# Patient Record
Sex: MALE | Race: WHITE | HISPANIC OR LATINO | Employment: FULL TIME | URBAN - METROPOLITAN AREA
[De-identification: names, ages, dates, MRNs, and addresses within clinical notes are randomized per-mention and may not be internally consistent; named-entity substitution may affect disease eponyms.]

---

## 2024-08-10 ENCOUNTER — OFFICE VISIT (OUTPATIENT)
Dept: URGENT CARE | Facility: URGENT CARE | Age: 24
End: 2024-08-10
Payer: OTHER MISCELLANEOUS

## 2024-08-10 ENCOUNTER — APPOINTMENT (OUTPATIENT)
Dept: RADIOLOGY | Facility: HOSPITAL | Age: 24
End: 2024-08-10
Attending: STUDENT IN AN ORGANIZED HEALTH CARE EDUCATION/TRAINING PROGRAM
Payer: OTHER MISCELLANEOUS

## 2024-08-10 ENCOUNTER — HOSPITAL ENCOUNTER (EMERGENCY)
Facility: HOSPITAL | Age: 24
Discharge: HOME | End: 2024-08-10
Attending: EMERGENCY MEDICINE | Admitting: EMERGENCY MEDICINE
Payer: OTHER MISCELLANEOUS

## 2024-08-10 VITALS
SYSTOLIC BLOOD PRESSURE: 146 MMHG | RESPIRATION RATE: 20 BRPM | WEIGHT: 171 LBS | DIASTOLIC BLOOD PRESSURE: 80 MMHG | HEART RATE: 59 BPM | OXYGEN SATURATION: 99 % | TEMPERATURE: 97 F

## 2024-08-10 VITALS
HEART RATE: 79 BPM | RESPIRATION RATE: 19 BRPM | OXYGEN SATURATION: 97 % | DIASTOLIC BLOOD PRESSURE: 86 MMHG | WEIGHT: 213.63 LBS | SYSTOLIC BLOOD PRESSURE: 136 MMHG | TEMPERATURE: 98.3 F

## 2024-08-10 DIAGNOSIS — S69.91XA FINGERNAIL INJURY, RIGHT, INITIAL ENCOUNTER: ICD-10-CM

## 2024-08-10 DIAGNOSIS — Z23 NEED FOR TETANUS BOOSTER: ICD-10-CM

## 2024-08-10 DIAGNOSIS — S67.02XA CRUSH INJURY TO THUMB, LEFT, INITIAL ENCOUNTER: Primary | ICD-10-CM

## 2024-08-10 PROCEDURE — 12002 RPR S/N/AX/GEN/TRNK2.6-7.5CM: CPT

## 2024-08-10 PROCEDURE — 250N000009 HC RX 250: Performed by: EMERGENCY MEDICINE

## 2024-08-10 PROCEDURE — 90715 TDAP VACCINE 7 YRS/> IM: CPT | Performed by: EMERGENCY MEDICINE

## 2024-08-10 PROCEDURE — 90471 IMMUNIZATION ADMIN: CPT | Performed by: EMERGENCY MEDICINE

## 2024-08-10 PROCEDURE — 99204 OFFICE O/P NEW MOD 45 MIN: CPT | Performed by: PHYSICIAN ASSISTANT

## 2024-08-10 PROCEDURE — 250N000013 HC RX MED GY IP 250 OP 250 PS 637: Performed by: EMERGENCY MEDICINE

## 2024-08-10 PROCEDURE — 99283 EMERGENCY DEPT VISIT LOW MDM: CPT | Mod: 25

## 2024-08-10 PROCEDURE — 73140 X-RAY EXAM OF FINGER(S): CPT | Mod: RT

## 2024-08-10 PROCEDURE — 250N000011 HC RX IP 250 OP 636: Performed by: EMERGENCY MEDICINE

## 2024-08-10 RX ORDER — CLINDAMYCIN HCL 150 MG
450 CAPSULE ORAL ONCE
Status: COMPLETED | OUTPATIENT
Start: 2024-08-10 | End: 2024-08-10

## 2024-08-10 RX ORDER — CLINDAMYCIN HCL 150 MG
450 CAPSULE ORAL 3 TIMES DAILY
Qty: 63 CAPSULE | Refills: 0 | Status: SHIPPED | OUTPATIENT
Start: 2024-08-10 | End: 2024-08-17

## 2024-08-10 RX ORDER — GINSENG 100 MG
CAPSULE ORAL ONCE
Status: COMPLETED | OUTPATIENT
Start: 2024-08-10 | End: 2024-08-10

## 2024-08-10 RX ADMIN — CLOSTRIDIUM TETANI TOXOID ANTIGEN (FORMALDEHYDE INACTIVATED), CORYNEBACTERIUM DIPHTHERIAE TOXOID ANTIGEN (FORMALDEHYDE INACTIVATED), BORDETELLA PERTUSSIS TOXOID ANTIGEN (GLUTARALDEHYDE INACTIVATED), BORDETELLA PERTUSSIS FILAMENTOUS HEMAGGLUTININ ANTIGEN (FORMALDEHYDE INACTIVATED), BORDETELLA PERTUSSIS PERTACTIN ANTIGEN, AND BORDETELLA PERTUSSIS FIMBRIAE 2/3 ANTIGEN 0.5 ML: 5; 2; 2.5; 5; 3; 5 INJECTION, SUSPENSION INTRAMUSCULAR at 19:54

## 2024-08-10 RX ADMIN — BACITRACIN: 500 OINTMENT TOPICAL at 20:17

## 2024-08-10 RX ADMIN — CLINDAMYCIN HYDROCHLORIDE 450 MG: 150 CAPSULE ORAL at 20:27

## 2024-08-10 ASSESSMENT — COLUMBIA-SUICIDE SEVERITY RATING SCALE - C-SSRS
1. IN THE PAST MONTH, HAVE YOU WISHED YOU WERE DEAD OR WISHED YOU COULD GO TO SLEEP AND NOT WAKE UP?: NO
6. HAVE YOU EVER DONE ANYTHING, STARTED TO DO ANYTHING, OR PREPARED TO DO ANYTHING TO END YOUR LIFE?: NO
2. HAVE YOU ACTUALLY HAD ANY THOUGHTS OF KILLING YOURSELF IN THE PAST MONTH?: NO

## 2024-08-10 ASSESSMENT — ACTIVITIES OF DAILY LIVING (ADL)
ADLS_ACUITY_SCORE: 35

## 2024-08-10 ASSESSMENT — PAIN SCALES - GENERAL: PAINLEVEL: SEVERE PAIN (6)

## 2024-08-10 NOTE — PROGRESS NOTES
Tim ryanne  Crush between tow metal pipes  215pm today        BP (!) 146/80 (BP Location: Left arm, Patient Position: Sitting, Cuff Size: Adult Regular)   Pulse 59   Temp 97  F (36.1  C) (Temporal)   Resp 20   Wt 77.6 kg (171 lb)   SpO2 99%     Significant trauma to nail bed  Numbness at thumb pad    (S67.02XA) Crush injury to thumb, left, initial encounter  (primary encounter diagnosis)  Comment: numbness, nail bed trauma  Plan: to ED for assessment and treatment       <<--- Click to launch

## 2024-08-10 NOTE — Clinical Note
Ilia Jaffe Reji was seen and treated in our emergency department on 8/10/2024.  He may return to work on 08/13/2024.       If you have any questions or concerns, please don't hesitate to call.      Toshia Gifford RN

## 2024-08-10 NOTE — ED TRIAGE NOTES
Happened at 1415 while trying to move steel forms that are heavy another co -worker jammed it on pt's right finger.  Dressing in place.  Last Tetanus on the list was 2010

## 2024-08-11 NOTE — DISCHARGE INSTRUCTIONS
Running water over the laceration is ok but do not soak the wound until the sutures are removed; soaking could prematurely loosen the sutures.    Use over-the-counter ointment (like Bacitracin or Neosporin) to the laceration to help prevent infection. Vaseline is also just as good at preventing infection and often much cheaper.    Get the sutures removed in 7 days in primary care clinic.    Take the antibiotic (clindamycin) to help prevent infection.    Return for any pus draining, streaking skin redness, or any other concerns.

## 2024-08-11 NOTE — ED PROVIDER NOTES
EMERGENCY DEPARTMENT ENCOUNTER      NAME: Ilia Mendoza  AGE: 24 year old male  YOB: 2000  MRN: [id number]  EVALUATION DATE & TIME: 8/10/2024  5:58 PM    PCP: No Ref-Primary, Physician    ED PROVIDER: Dale Encarnacion M.D.      Chief Complaint   Patient presents with    Finger         IMPRESSION  1. Fingernail injury, right, initial encounter    2. 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 No laceration to skin but nail dislocated from its proximal base. X-rays with no fracture. No clinical concern for neurovascular injury, tendon injury.    Digital block placed and nail re-seated in eponychial fold. Nail intact with no subungual hematoma or laceration. Nail sutured in placed with 4 4-0 Ethilon sutures. Patient tolerated this well. Dressed with bacitracin & clean bandage. Given prophylactic clindamycin as well given overall dirty construction-worker hands. Tetanus updated too. Ok for outpatient follow up. Patient comfortable with discharge at this time. Return precautions and need for PCP follow up discussed and understood. No further questions at the time of discharge.       --------------------------------------------------------------------------------   --------------------------------------------------------------------------------     7:37 PM I met with the patient for the initial interview and physical examination. Discussed plan for treatment and workup in the ED.      This patient involved a high degree of complexity in medical decision making, as significant risks were present and assessed. Recent  encounters & results in medical record reviewed by me.    All workup (i.e. any EKG/labs/imaging as per charting below) reviewed and independently interpreted by me. See respective sections for details.    Broad differential considered for this patient, including but not limited to:  superficial laceration, nail injury, nailbed laceration, subungual hematoma, fracture, tendon injury, neurovascular injury      See additional MDM below if interested.      MEDICATIONS GIVEN IN THE EMERGENCY DEPARTMENT  Medications   Tdap (tetanus-diphtheria-acell pertussis) (ADACEL) injection 0.5 mL (0.5 mLs Intramuscular $Given 8/10/24 1954)   bacitracin ointment ( Topical $Given 8/10/24 2017)   clindamycin (CLEOCIN) capsule 450 mg (450 mg Oral $Given 8/10/24 2027)       NEW PRESCRIPTIONS STARTED AT TODAY'S ER VISIT  Discharge Medication List as of 8/10/2024  8:20 PM        START taking these medications    Details   clindamycin (CLEOCIN) 150 MG capsule Take 3 capsules (450 mg) by mouth 3 times daily for 7 days, Disp-63 capsule, R-0, E-Prescribe                 =================================================================      HPI  Use of : N/A         Ilia Mendoza is a 24 year old male with no pertinent history who presents to this ED by walk-in with his spouse for evaluation of a finger injury.     Per [facility], patient's last tetanus was on 8/19/2010.     At 2:15 PM, while the patient was trying to move steel forms, another coworker jammed it on the patient's right digitus primus manus.         --------------- MEDICAL HISTORY ---------------  PAST MEDICAL HISTORY:  Reviewed independently by me.  No past medical history on file.  Patient Active Problem List   Diagnosis    Acne       PAST SURGICAL HISTORY:  Reviewed independently by me.  No past surgical history on file.    CURRENT MEDICATIONS:    Reviewed independently by me.  No current facility-administered medications for this encounter.    Current Outpatient  Medications:     clindamycin (CLEOCIN) 150 MG capsule, Take 3 capsules (450 mg) by mouth 3 times daily for 7 days, Disp: 63 capsule, Rfl: 0    ALLERGIES:  Reviewed independently by me.  No Known Allergies    FAMILY HISTORY:  Reviewed independently by me.  No family history on file.      SOCIAL HISTORY:   Reviewed independently by me.  Social History     Socioeconomic History    Marital status: Single   Tobacco Use    Smoking status: Never    Smokeless tobacco: Never       --------------- PHYSICAL EXAM ---------------  Nursing notes and vitals independently reviewed by me.  VITALS:  Vitals:    08/10/24 1754   BP: 136/86   Pulse: 79   Resp: 19   Temp: 98.3  F (36.8  C)   TempSrc: Oral   SpO2: 97%   Weight: 96.9 kg (213 lb 10 oz)       PHYSICAL EXAM:    General:  alert, interactive, no distress  Eyes:  conjunctivae clear, conjugate gaze  HENT:  atraumatic, nose with no rhinorrhea, oropharynx clear  Neck:  no meningismus  Cardiovascular:  HR 70s during exam, regular rhythm, no murmurs, brisk cap refill  Chest:  no chest wall tenderness  Pulmonary:  no stridor, normal phonation, normal work of breathing, clear lungs bilaterally  Abdomen:  soft, nondistended, nontender  :  no CVA tenderness  Back:  no midline spinal tenderness  Musculoskeletal:  Right thumb with nail intact. Entirety of the nail is unseeded from base with mild underlying bleeding. No skin laceration, no pain with rom of joints.   Skin:  warm, dry, no rash  Neuro:  awake, alert, answers questions appropriately, follows commands, moves all limbs  Psych:  calm, normal affect      --------------- RESULTS ---------------  RADIOLOGY:  Reviewed and independently interpreted by me. Please see official radiology report.  Recent Results (from the past 24 hour(s))   XR Finger Right G/E 2 Views    Narrative    EXAM: XR FINGER RIGHT G/E 2 VIEWS  LOCATION: [facility]  DATE: 8/10/2024    INDICATION: Crush injury. Numbness. Nailbed  trauma.  COMPARISON: None.      Impression    IMPRESSION: Small old nonunited ulnar styloid process fracture. Possible nondisplaced fracture of the distal tuft of the thumb only visible on the AP view of the thumb, so it is probably artifactual, particularly given the overlying bandaging. Otherwise   negative.           PROCEDURES:   [facility]    -Laceration Repair    Date/Time: 8/10/2024 8:15 PM    Performed by: Dale Encarnacion MD  Authorized by: Dale Encarnacion MD    Risks, benefits and alternatives discussed.      UNIVERSAL PROTOCOL   Site Marked: NA  Prior Images Obtained and Reviewed:  Yes  Required items: Required blood products, implants, devices and special equipment available    Patient identity confirmed:  Verbally with patient  NA - No sedation, light sedation, or local anesthesia  Confirmation Checklist:  Relevant allergies and correct equipment/implants were available  Universal Protocol: the Joint Commission Universal Protocol was followed    Preparation: Patient was prepped and draped in usual sterile fashion      ANESTHESIA (see MAR for exact dosages):     Anesthesia method:  Nerve block    Block location:  Right thumb    Block needle gauge:  27 G    Block anesthetic:  Lidocaine 1% w/o epi    Block technique:  Digital block    Block injection procedure:  Anatomic landmarks identified, anatomic landmarks palpated, introduced needle, negative aspiration for blood and incremental injection    Block outcome:  Anesthesia achieved    LACERATION DETAILS     Location:  Finger    Finger location:  R thumb    Length (cm):  3    Depth (mm):  2    REPAIR TYPE:     Repair type:  Complex    EXPLORATION:     Wound exploration: wound explored through full range of motion and entire depth of wound probed and visualized      TREATMENT:     Area cleansed with:  Cristian    Amount of cleaning:  Extensive    Irrigation solution:  Sterile saline    Irrigation method:  Syringe     Visualized foreign bodies/material removed: no      SKIN REPAIR     Repair method:  Sutures    Suture size:  4-0    Suture material:  Nylon    Suture technique:  Simple interrupted    Number of sutures:  4    APPROXIMATION     Approximation:  Close    POST-PROCEDURE DETAILS     Dressing:  Antibiotic ointment and non-adherent dressing      PROCEDURE  Describe Procedure: Nail completely dislodged from eponycheal fold with no underlying nailbed laceration or subungual hematoma. Nail reseated into eponycheal fold and sutured in place with 4 sutures as above.  Patient Tolerance:  Patient tolerated the procedure well with no immediate complications           --------------- ADDITIONAL MDM ---------------  History:  - I considered systemic symptoms of the presenting illness.  - Supplemental history from:       -- patient, girlfriend  - External Record(s) reviewed:       -- Inpatient/outpatient record (vaccine record, clinic visit 5/1/24), prior labs (swabs 5/1/24), prior imaging (CT head/chest 8/21/23)       -- see above ED course & MDM for further details    Workup:  - Chart documentation above includes differential considered and any EKGs or imaging independently interpreted by provider.  - emergent/severe conditions considered and evaluated for: see above differential & MDM  - In additional to work up documented, I considered the following work up:       -- blood       -- see above ED course & MDM for further details    External Consultation:  - Discussion of management with another provider:       -- ED pharmacist re: meds       -- see above charting for additional    Complicating Factors:  - Care impacted by chronic illness:       -- see above MDM, past medical history, & problem list  - Care affected by social determinants of health:       -- see above social history       -- Access to Affordable Healthcare       -- Access to Medical Care    Disposition Considerations:  - Discharge       -- I considered escalation of  care with admission to the hospital, but ultimately discharged the patient per decision making above       -- I recommended the patient continue their current prescription strength medication(s) as charted above in current medications list       -- I prescribed prescription strength medication(s) as charted above       -- I recommended over-the-counter medication(s) as charted above & in discharge instructions         I, Marly Rios, am serving as a scribe to document services personally performed by Dr. Dale Encarnacion based on my observation and the provider's statements to me. I, Dale Encarnacion MD attest that Marly Nation is acting in a scribe capacity, has observed my performance of the services and has documented them in accordance with my direction.      Dale Encarnacion MD  08/10/24  Emergency Medicine  [facility] EMERGENCY DEPARTMENT  [address] [place] [id number]  [phone]  Dept: [phone]     Dale Encarnacion MD  08/10/24 2314

## 2024-08-18 ENCOUNTER — HOSPITAL ENCOUNTER (EMERGENCY)
Facility: HOSPITAL | Age: 24
Discharge: HOME | End: 2024-08-18
Admitting: PHYSICIAN ASSISTANT
Payer: OTHER MISCELLANEOUS

## 2024-08-18 VITALS
SYSTOLIC BLOOD PRESSURE: 142 MMHG | RESPIRATION RATE: 19 BRPM | HEART RATE: 66 BPM | BODY MASS INDEX: 32.45 KG/M2 | DIASTOLIC BLOOD PRESSURE: 63 MMHG | OXYGEN SATURATION: 99 % | TEMPERATURE: 98.5 F | HEIGHT: 68 IN | WEIGHT: 214.1 LBS

## 2024-08-18 DIAGNOSIS — Z48.02 VISIT FOR SUTURE REMOVAL: ICD-10-CM

## 2024-08-18 PROCEDURE — 99281 EMR DPT VST MAYX REQ PHY/QHP: CPT

## 2024-08-19 NOTE — ED PROVIDER NOTES
EMERGENCY DEPARTMENT ENCOUNTER   NAME: Ilia Mendoza ; AGE: 24 year old male ; YOB: 2000 ; MRN: [id number] ; PCP: No Ref-Primary, Physician     Evaluation Date & Time: No admission date for patient encounter.    ED Provider: Stacey Arreaga PA-C    CHIEF COMPLAINT     Stitch Removal      FINAL ASSESSMENT       ICD-10-CM    1. Visit for suture removal  Z48.02           ED COURSE, MEDICAL DECISION MAKING, PLAN     ED course     9:27 PM Evaluated patient. Performed physical exam. Removed sutures and cleaned out the area. Discharge and follow up plans discussed. RN to discharge.  ______________________________________________________________________    Reason for visit: Ilia Mendoza is a 24 year old male with no pertinent PMH presenting for stitch removal from right thumb. Had a crushing injury on 8/10 at work and avulsed the nail at the cuticle line. 4 sutures were placed through the nail and cuticle to tack nail back down.     Exam: 4 sutures in place at distal right nail through cuticle line. Scabbing present. Some swelling and bruising. Minimal limitation in movement at the DIP joint. Slightly elevated BP at 142/63 otherwise vitally normal.     Interventions/recheck: 4 sutures removed using scalpel and forceps. No complications. Nail appears to be tacked down well.     Assessment: Suture removal after crush injury resulting in thumb nail avulsion distally.   Overall, no red flag s/s present to suggest an acutely serious or life threatening condition that would necessitate hospitalization.     Plan: Continue with wound cleaning at home. Tylenol and Ibuprofen as needed for pain. Ice as needed.   Indications for re-evaluation in the ER discussed.   Patient/parent/guardian understanding and agreeable with the plan and will discharge to home in good condition.       *All pertinent lab & imaging studies independently reviewed. (See chart for details)   *Discussed the results of all the  "tests and plan with patient and family/guardians.   ______________________________________________________________________    Critical Care    N/A    HISTORY OF PRESENT ILLNESS   Patient information was obtained from: Patient   Use of Intrepreter: N/A     Ilia Mendoza is a 24 year old male here by means of walk in with no pertinent history for evaluation of stitches removal.    Patient reports he needs his stiches removed in his right thumb. He was seen on 8/10 after his thumb was smashed between 2 metal pieces at work and they put 4 stitches in. He was told to have them removed in 7 days.   He denies any notable pain.   Slightly limited ROM of the distal end of the thumb.   He denies additional symptoms or concerns.    Per my chart review  8/10/2024: [facility] ED for thumb injury. Nail dislocated from its proximal base. Xrays completed were negative. Nail sutured in placed with four 4-0 Ethilon. Tetanus was updated here. Recommend suture removal in 7 days. Patient given prophylactic clindamycin and discharged.     MEDICAL HISTORY     History reviewed. No pertinent past medical history.    History reviewed. No pertinent surgical history.    No family history on file.    Social History     Tobacco Use    Smoking status: Never    Smokeless tobacco: Never       No current outpatient medications on file.        PHYSICAL EXAM     First Vitals:  Patient Vitals for the past 24 hrs:   BP Temp Temp src Pulse Resp SpO2 Height Weight   08/18/24 2119 -- -- -- -- 19 -- -- --   08/18/24 2116 -- 98.5  F (36.9  C) Oral -- -- -- 1.727 m (5' 8\") 97.1 kg (214 lb 1.6 oz)   08/18/24 2115 (!) 142/63 -- -- 66 -- 99 % -- --   08/18/24 2114 -- -- -- -- -- 98 % -- --       PHYSICAL EXAM:   Constitutional: No acute distress.  Neuro: Awake and alert. No focal deficits.  Psych: Calm and cooperative.  Cardio: Regular rate. Adequate perfusion to extremities.   Pulmonary: Oxygenating well on RA. No labored breathing.   Upper " extremities: Slightly limited ROM at the DIP of the right thumb. Scant edema to the distal thumb. Distal pulses intact. Sensations intact.   Skin: 4 sutures through the nail and cuticle line. Scabbing present. No active bleeding or drainage. No skin redness.       RESULTS     LAB:  All pertinent labs reviewed and interpreted  Labs Ordered and Resulted from Time of ED Arrival to Time of ED Departure - No data to display    RADIOLOGY:  No orders to display       ECG:    N/A      PROCEDURES     None          MEDICAL DECISION MAKING:  Obtained supplemental history:Supplemental history obtained?: No  Reviewed external records: External records reviewed?: Documented in chart  Care impacted by chronic illness:N/A  Care significantly affected by social determinants of health:N/A  Did you consider but not order tests?: Work up considered but not performed and documented in chart, if applicable  Did you interpret images independently?: Independent interpretation of ECG and images noted in documentation, when applicable.  Consultation discussion with other provider:Did you involve another provider (consultant, MH, pharmacy, etc.)?: No  Discharge. No recommendations on prescription strength medication(s). See documentation for any additional details.      FINAL IMPRESSION:    ICD-10-CM    1. Visit for suture removal  Z48.02             MEDICATIONS GIVEN IN THE EMERGENCY DEPARTMENT:  Medications - No data to display      NEW PRESCRIPTIONS STARTED AT TODAY'S ED VISIT:  Discharge Medication List as of 8/18/2024  9:31 PM               Latanya MAHER, am serving as a scribe to document services personally performed by Stacey Arreaga PA-C, based on my observation and the provider's statements to me. Stacey MAHER PA-C attest that Latanya Nation is acting in a scribe capacity, has observed my performance of the services and has documented them in accordance with my direction.     Some or all of this documentation has been  completed using dictation software and mild grammatical errors may be present. Please contact me with any concerns regarding this.       Stacey Arreaga PA-C  Emergency Medicine   [facility] EMERGENCY DEPARTMENT       Stacey Arreaga PA-C  08/18/24 3143

## 2024-08-19 NOTE — ED TRIAGE NOTES
Patient arrives to triage from home with request to have stitches taken out of his right thumb.  Patient reports having initial procedure done here.  Alert and oriented x4.

## 2024-08-22 ENCOUNTER — HOSPITAL ENCOUNTER (EMERGENCY)
Facility: HOSPITAL | Age: 24
Discharge: HOME | End: 2024-08-22
Attending: EMERGENCY MEDICINE | Admitting: EMERGENCY MEDICINE
Payer: OTHER MISCELLANEOUS

## 2024-08-22 ENCOUNTER — TRANSFERRED RECORDS (OUTPATIENT)
Dept: HEALTH INFORMATION MANAGEMENT | Facility: CLINIC | Age: 24
End: 2024-08-22
Payer: COMMERCIAL

## 2024-08-22 VITALS
TEMPERATURE: 97.9 F | HEART RATE: 62 BPM | BODY MASS INDEX: 32.54 KG/M2 | DIASTOLIC BLOOD PRESSURE: 74 MMHG | RESPIRATION RATE: 18 BRPM | WEIGHT: 214 LBS | SYSTOLIC BLOOD PRESSURE: 140 MMHG | OXYGEN SATURATION: 100 %

## 2024-08-22 DIAGNOSIS — S61.319D NAILBED LACERATION, FINGER, SUBSEQUENT ENCOUNTER: ICD-10-CM

## 2024-08-22 PROCEDURE — 11730 AVULSION NAIL PLATE SIMPLE 1: CPT | Mod: F5

## 2024-08-22 PROCEDURE — 99283 EMERGENCY DEPT VISIT LOW MDM: CPT | Mod: 25

## 2024-08-22 ASSESSMENT — ACTIVITIES OF DAILY LIVING (ADL)
ADLS_ACUITY_SCORE: 35
ADLS_ACUITY_SCORE: 35

## 2024-08-22 ASSESSMENT — COLUMBIA-SUICIDE SEVERITY RATING SCALE - C-SSRS
1. IN THE PAST MONTH, HAVE YOU WISHED YOU WERE DEAD OR WISHED YOU COULD GO TO SLEEP AND NOT WAKE UP?: NO
2. HAVE YOU ACTUALLY HAD ANY THOUGHTS OF KILLING YOURSELF IN THE PAST MONTH?: NO
6. HAVE YOU EVER DONE ANYTHING, STARTED TO DO ANYTHING, OR PREPARED TO DO ANYTHING TO END YOUR LIFE?: NO

## 2024-08-22 NOTE — ED PROVIDER NOTES
ED CONSULTATION  Date/Time:8/22/2024 10:09 AM    I am seeing this patient along with Queta Flores PA-C.  I, Lindsay White MD have reviewed the documentation, personally taken the patient's history, performed an exam and agree with the physical finds, diagnosis and management plan.    HPI: Ilia Mendoza is a 24 year old male who presents to the ED with thumb discomfort.     The creation of this record is based on the scribe s observations of the work being performed by Lindsay White MD and the provider s statements to them. It was created on her behalf by Vivienne starr trained medical scribe. This document has been checked and approved by the attending provider.    Physical Exam:BP (!) 162/74   Pulse 72   Temp 97.9  F (36.6  C)   Resp 18   Wt 97.1 kg (214 lb)   SpO2 100%   BMI 32.54 kg/m    Constitutional: Well developed, well nourished, no acute distress   EYES: Conjunctivae clear  HENT: Atraumatic, external ears normal, nose normal, oropharynx moist. Neck- supple   Respiratory: No respiratory distress, normal breath sounds, no rales, no wheezing   Cardiovascular: Normal rate, normal rhythm, no murmurs, no gallops, no rubs. No chest tenderness  Musculoskeletal: No edema, No tenderness, No cyanosis, No clubbing. Good range of motion in all major joints. No tenderness to palpation or major deformities noted.   Integument: Warm, Dry, No erythema, No rash. Nailbed along the right thumb is adhered with an avulsed nail and healing nailbed. No erythema, warmth, discharge   Neurologic: Alert & oriented x 3, no focal deficits noted, ambulatory  Psych: Affect normal, Judgment normal, Mood normal.    MDM: Patient presents after a fingernail injury that occurred 12 days ago.  At that time, his fingernail had been removed and reinserted to keep the germinal matrix open.  The patient then presented on August 18 for suture removal.  He presents today as this morning he feels the fingernail ripped  off.  Upon further evaluation, it appears that the germinal matrix is adhered closed.  I do not see anything to acutely insert the remaining nail into.  Will plan to consult orthopedic hand for further recommendations and follow-up plan.      1. Nailbed laceration, finger, subsequent encounter          New Prescriptions    No medications on file       Final disposition will be per the depending diagnostic studies and patient's clinical trajectory.    This is an accurate record of my words and actions during this visit as documented by the scribe.        Lindsay White MD  08/22/24 [id number]

## 2024-08-22 NOTE — DISCHARGE INSTRUCTIONS
Rest.  Orally hydrate.  Keep your bandage on.  Change your bandage every 24 hours.  Keep your brace on at all times to prevent injury.    Continue to take your antibiotics that were prescribed to you during your previous ED visit.  Follow-up with [facility] hand to discuss your ED visit and your injury.  Return to the ED if new symptoms develop or symptoms worsen.  You have been referred to a primary care doctor to discuss your ED visit.

## 2024-08-22 NOTE — ED PROVIDER NOTES
EMERGENCY DEPARTMENT ENCOUNTER      NAME: Ilia Mendoza  AGE: 24 year old male  YOB: 2000  MRN: [id number]  EVALUATION DATE & TIME: 8/22/2024  9:18 AM    PCP: No Ref-Primary, Physician    ED PROVIDER: Queta Flores PA-C      Chief Complaint   Patient presents with    Thumb Discomfort       FINAL IMPRESSION:  1. Nailbed laceration, finger, subsequent encounter      ED COURSE & MEDICAL DECISION MAKING:    Pertinent Labs & Imaging studies reviewed. (See chart for details)  24 year old male presents to the Emergency Department for evaluation of thumb injury.  Per chart review on 8/10/2024 smashed his thumb while at work patient's nail dislocated from the proximal base.  X-ray showed no fracture.  4 sutures were placed in the nail.  He was discharged home on clindamycin.  On 8/18/2024 patient return to the emergency room for suture removal. Patient reports he has not been taking any of his clindamycin.  Today patient returns to the emergency room for thumb nail removal. Patient reports he bumped his right nail at work and the thumb nail popped out of the bed. No pain. No bleeding.  Patient is hypertensive. Afebrile.  On exam, right hand and right thumb is nontender to palpation.  Normal range of motion, sensation and strength of the bilateral upper extremity.  No overlying erythema, edema, ecchymosis.  No lacerations or abrasions.  Normal warmth.  Pulses are 2+ bilaterally.  Normal capillary refill.  Nail of the right thumb is out of the nailbed. Nailbed of the right thumb is adhered with an avulsed nail and healing nailbed.     Tetanus was updated on 8/10/24. Finger block was performed. I spoke with [facility] who recommended removing the nail completely and having the patient follow up in clinic.  Orthopedic was concerned if we attempted to reattach the nail it would cause increased risk of infection.  [facility] did not want additional antibiotics prescribed and recommend patient  continue to take his clindamycin.  Patient reports that he has not been taking his clindamycin and will take his clindamycin that was prescribed to him previously.  Nail removed and patient tolerated procedure well. Wound care was discussed with patient.  I discussed the importance of wearing his metal splint to protect the nailbed.  Educated on the antibiotics and treatment plan. Patient will follow-up with [facility] as soon as possible.  Patient return to the ED if new symptoms develop or symptoms worsen.  Patient will follow-up with his primary care doctor discuss ED visit.  All questions answered.  Patient agrees with plan.      ED COURSE:   9:21 AM  I saw the patient.   10:14 AM Staffed with Dr. White.    10:40 AM Spoke with [facility] who will see the patient in clinic.  11:06 AM nail removed.  Patient will be discharge home.  Patient agrees with plan.  All questions answered.  At the conclusion of the encounter I discussed the results of all of the tests and the disposition. The questions were answered. The patient or family acknowledged understanding and was agreeable with the care plan.     0 minutes of critical care time       Medical Decision Making  Obtained supplemental history:Supplemental history obtained?: No  Reviewed external records: External records reviewed?: No  Care impacted by chronic illness:N/A  Care significantly affected by social determinants of health:N/A  Did you consider but not order tests?: Work up considered but not performed and documented in chart, if applicable  Did you interpret images independently?: Independent interpretation of ECG and images noted in documentation, when applicable.  Consultation discussion with other provider:Did you involve another provider (consultant, MH, pharmacy, etc.)?: I discussed the care with another health care provider, see documentation for details.  Discharge. No recommendations on prescription strength medication(s). See  documentation for any additional details.      MEDICATIONS GIVEN IN THE EMERGENCY:  Medications - No data to display    NEW PRESCRIPTIONS STARTED AT TODAY'S ER VISIT  New Prescriptions    No medications on file          =================================================================    HPI    Patient information was obtained from: patient    Use of : N/A        Ilia Mendoza is a 24 year old male who presents to this ED for evaluation of thumb pain.    Per chart review on 8/10/2024 patient's smashed his thumb while at work.  Thumb x-ray at that time was negative for fracture.  4 sutures were placed in the nailbed to secure the nail to the finger.  Patient was started on clindamycin.  On 8/18/2024 patient return to the emergency room for suture removal.  4 sutures removed.    Patient returns to the ED today for nail removal.  Patient reports that he bumped his finger today while at work and the nail popped up.  Patient would like the nail removed.    Denies fevers, chills, pain.      REVIEW OF SYSTEMS   As per HPI    PAST MEDICAL HISTORY:  No past medical history on file.    PAST SURGICAL HISTORY:  No past surgical history on file.        CURRENT MEDICATIONS:    No current outpatient medications on file.      ALLERGIES:  No Known Allergies    FAMILY HISTORY:  No family history on file.    SOCIAL HISTORY:   Social History     Socioeconomic History    Marital status: Single   Tobacco Use    Smoking status: Never    Smokeless tobacco: Never       VITALS:  BP (!) 162/74   Pulse 72   Temp 97.9  F (36.6  C)   Resp 18   Wt 97.1 kg (214 lb)   SpO2 100%   BMI 32.54 kg/m      PHYSICAL EXAM    Physical Exam  Vitals and nursing note reviewed.   Constitutional:       Appearance: Normal appearance.   HENT:      Head: Atraumatic.      Right Ear: External ear normal.      Left Ear: External ear normal.      Nose: Nose normal.      Mouth/Throat:      Mouth: Mucous membranes are moist.   Eyes:       Conjunctiva/sclera: Conjunctivae normal.      Pupils: Pupils are equal, round, and reactive to light.   Cardiovascular:      Rate and Rhythm: Normal rate and regular rhythm.      Pulses: Normal pulses.      Heart sounds: Normal heart sounds. No murmur heard.     No friction rub. No gallop.   Pulmonary:      Effort: Pulmonary effort is normal.      Breath sounds: Normal breath sounds. No wheezing or rales.   Abdominal:      Tenderness: There is no abdominal tenderness. There is no guarding or rebound.   Musculoskeletal:      Cervical back: Normal range of motion.      Comments: Right hand and right thumb is nontender to palpation.  Normal range of motion, sensation and strength of the right upper extremity.  No overlying erythema, edema, ecchymosis.  No lacerations or abrasions.  Normal warmth.  No bleeding.  Nail of the right thumb is out of the nailbed. Nailbed of the right thumb is adhered with an avulsed nail and healing nailbed.    Skin:     General: Skin is dry.      Capillary Refill: Capillary refill takes less than 2 seconds.      Findings: No rash.   Neurological:      General: No focal deficit present.      Mental Status: He is alert and oriented to person, place, and time. Mental status is at baseline.   Psychiatric:         Mood and Affect: Mood normal.         Thought Content: Thought content normal.          LAB:  All pertinent labs reviewed and interpreted.  Labs Ordered and Resulted from Time of ED Arrival to Time of ED Departure - No data to display     RADIOLOGY:  Reviewed all pertinent imaging. Please see official radiology report.  No orders to display     Procedure:   PROCEDURE: Digital Block   INDICATIONS: Thumb nail   PROCEDURE PROVIDER: Queta Flores PA-C   SITE: Right thumb (1st digit)   MEDICATION: 3 mL of 1% Lidocaine without epinephrine   NOTE: The skin overlying the site for injection was prepped with chlorhexidine.  Needle was inserted in a standard three point injection pattern.   Each time the area was aspirated and there was no return of blood.  I then injected the medication at the base of the digit.  The patient had good response to the procedure   COMPLICATIONS: Patient tolerated procedure well, without complication     PROCEDURE: Nail removal   INDICATIONS: Nail out of nailbed   PROCEDURE PROVIDER: Queta Flores PA-C   SITE: Right Thumb   MEDICATION: Digital block   NOTE: The area was prepped with chlorhexidine and draped off in the usual sterile fashion. Local anesthetic was injected subcutaneously with anesthesia effects demonstrated prior to proceeding. The nail was removed. The nail bed cavity was bluntly explored to separate any loculations. A sterile dressing was placed over the area.   COMPLEXITY: simple    Simple = single, furuncle, paronychia, superficial  Complex = multiple or abscess requiring probing, loculations, packing placement   COMPLICATIONS: Patient tolerated procedure well, without complication         Queta Flores PA-C  [facility] EMERGENCY DEPARTMENT  [address] [place] [id number]  [phone]     Queta Flores PA-C  08/26/24 [id number]

## 2024-08-22 NOTE — ED TRIAGE NOTES
Patient arrives by private car, requesting to have the nail on his right thumb removed.  Nail is elevated and patient was seen for original injury.